# Patient Record
(demographics unavailable — no encounter records)

---

## 2025-05-22 NOTE — HISTORY OF PRESENT ILLNESS
[FreeTextEntry1] : 17 year well  Going to [facility] for [facility]- Needs MMR titiers Goes to the gym  MenB vaccine refusal

## 2025-05-22 NOTE — HISTORY OF PRESENT ILLNESS
[FreeTextEntry1] : 17 year well  Going to [facility]- Needs MMR titiers Goes to the gym  MenB vaccine refusal

## 2025-05-26 NOTE — DISCUSSION/SUMMARY
[FreeTextEntry1] : 17 year well  Going to [facility]- Needs MMR titers Goes to the gym  Vaccine: MenB vaccine refusal

## 2025-05-26 NOTE — DISCUSSION/SUMMARY
[FreeTextEntry1] : 17 year well  Going to [facility] for [facility]- Needs MMR titers Goes to the gym  Vaccine: MenB vaccine refusal